# Patient Record
(demographics unavailable — no encounter records)

---

## 2025-05-28 NOTE — PHYSICAL EXAM
[Normal] : supple, no neck mass and thyroid not enlarged [Normal Neck Lymph Nodes] : normal neck lymph nodes  [Normal Supraclavicular Lymph Nodes] : normal supraclavicular lymph nodes [Normal Groin Lymph Nodes] : normal groin lymph nodes [Normal Axillary Lymph Nodes] : normal axillary lymph nodes [Normal] : oriented to person, place and time, with appropriate affect [de-identified] : right breast nodularity 9:00 at site of biopsy - US confirms mass w bx clip, right axillary adenopathy

## 2025-05-28 NOTE — HISTORY OF PRESENT ILLNESS
[de-identified] : Patient is a 56 y/o F who presents a chief complaint of enlarged right axillary lymph nodes and right breast lump.  (03/05/25) Right 8:00 hypoechoic mass and right axillary mass USGbx w/ flow cytometry path: -Atypical lymphoplasmacytic monocytosis  No evidence of monoclonal IGH or IGK gene rearrangement.  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   Diag [facility]G/US 02/11/25 ([facility]) -  Dilated ducts with hypervascularity right breast 8:00 N3 are suspicious and corresponds with area of palpable of concern and a developing asymmetry at 9:00 on mmg. An abnormal right axillary lymph node measuring 9 x 6 x 9 mm.  (BIRADS 4 - rec biopsies)  (03/12/2024) S/p bx right breast 5:00 N4 - reactive lymphoid hyperplasia  *benign and concordant*  Diag R MMG/US 2/22/24 - Indeterminate mass right breast 5:00 N4 measuring 4.1 cm - rec USGbx (BIRADS  4)  (12/19/2019) USGbx right axillary lymph node ?path  Maternal cousin had breast cancer.

## 2025-05-28 NOTE — ADDENDUM
[FreeTextEntry1] : I, Sandie Longoria, acted solely as a scribe for Dr. Marko Iniguez on this date 05/28/2025.

## 2025-05-28 NOTE — PHYSICAL EXAM
[Normal] : supple, no neck mass and thyroid not enlarged [Normal Neck Lymph Nodes] : normal neck lymph nodes  [Normal Supraclavicular Lymph Nodes] : normal supraclavicular lymph nodes [Normal Groin Lymph Nodes] : normal groin lymph nodes [Normal Axillary Lymph Nodes] : normal axillary lymph nodes [Normal] : oriented to person, place and time, with appropriate affect [de-identified] : right breast nodularity 9:00 at site of biopsy - US confirms mass w bx clip, right axillary adenopathy

## 2025-05-28 NOTE — HISTORY OF PRESENT ILLNESS
[de-identified] : Patient is a 56 y/o F who presents a chief complaint of enlarged right axillary lymph nodes and right breast lump.  (03/05/25) Right 8:00 hypoechoic mass and right axillary mass USGbx w/ flow cytometry path: -Atypical lymphoplasmacytic monocytosis  No evidence of monoclonal IGH or IGK gene rearrangement.  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   Diag [facility]G/US 02/11/25 ([facility]) -  Dilated ducts with hypervascularity right breast 8:00 N3 are suspicious and corresponds with area of palpable of concern and a developing asymmetry at 9:00 on mmg. An abnormal right axillary lymph node measuring 9 x 6 x 9 mm.  (BIRADS 4 - rec biopsies)  (03/12/2024) S/p bx right breast 5:00 N4 - reactive lymphoid hyperplasia  *benign and concordant*  Diag R MMG/US 2/22/24 - Indeterminate mass right breast 5:00 N4 measuring 4.1 cm - rec USGbx (BIRADS  4)  (12/19/2019) USGbx right axillary lymph node ?path  Maternal cousin had breast cancer.

## 2025-05-28 NOTE — REASON FOR VISIT
[Initial Consultation] : an initial consultation for [Adenopathy] : adenopathy [Breast Mass] : breast mass

## 2025-05-28 NOTE — CONSULT LETTER
[Dear  ___] : Dear  [unfilled], [Consult Letter:] : I had the pleasure of evaluating your patient, [unfilled]. [Please see my note below.] : Please see my note below. [Sincerely,] : Sincerely, [FreeTextEntry3] : Marko Iniguez MD FACS

## 2025-05-28 NOTE — ASSESSMENT
[FreeTextEntry1] : Right breast mass s/p core bx  Path c/w extra tee marginal zone B-cell lymphoma I had a long discussion with the pt regarding her diagnosis, prognosis and all management options Case d/w Dr. Otto of medical oncology Will order PET scan to evaluate extent of disease Will likely plan surgical resection right breast mass pending PET scan Surgical procedure discussed in detail All questions answered Medical clearance 
[FreeTextEntry1] : Right breast mass s/p core bx  Path c/w extra tee marginal zone B-cell lymphoma I had a long discussion with the pt regarding her diagnosis, prognosis and all management options Case d/w Dr. Otto of medical oncology Will order PET scan to evaluate extent of disease Will likely plan surgical resection right breast mass pending PET scan Surgical procedure discussed in detail All questions answered Medical clearance 
eczema lesion to  left leg at this time